# Patient Record
Sex: FEMALE | Race: BLACK OR AFRICAN AMERICAN
[De-identification: names, ages, dates, MRNs, and addresses within clinical notes are randomized per-mention and may not be internally consistent; named-entity substitution may affect disease eponyms.]

---

## 2019-03-29 ENCOUNTER — HOSPITAL ENCOUNTER (EMERGENCY)
Dept: HOSPITAL 72 - EMR | Age: 29
Discharge: HOME | End: 2019-03-29
Payer: COMMERCIAL

## 2019-03-29 VITALS — DIASTOLIC BLOOD PRESSURE: 85 MMHG | SYSTOLIC BLOOD PRESSURE: 122 MMHG

## 2019-03-29 VITALS — HEIGHT: 66 IN | WEIGHT: 140 LBS | BODY MASS INDEX: 22.5 KG/M2

## 2019-03-29 VITALS — SYSTOLIC BLOOD PRESSURE: 120 MMHG | DIASTOLIC BLOOD PRESSURE: 78 MMHG

## 2019-03-29 DIAGNOSIS — R10.9: Primary | ICD-10-CM

## 2019-03-29 LAB
ADD MANUAL DIFF: NO
ALBUMIN SERPL-MCNC: 4.3 G/DL (ref 3.4–5)
ALBUMIN/GLOB SERPL: 1 {RATIO} (ref 1–2.7)
ALP SERPL-CCNC: 63 U/L (ref 46–116)
ALT SERPL-CCNC: 21 U/L (ref 12–78)
ANION GAP SERPL CALC-SCNC: 11 MMOL/L (ref 5–15)
AST SERPL-CCNC: 14 U/L (ref 15–37)
BASOPHILS NFR BLD AUTO: 1.3 % (ref 0–2)
BILIRUB SERPL-MCNC: 0.5 MG/DL (ref 0.2–1)
BUN SERPL-MCNC: 11 MG/DL (ref 7–18)
CALCIUM SERPL-MCNC: 9.4 MG/DL (ref 8.5–10.1)
CHLORIDE SERPL-SCNC: 103 MMOL/L (ref 98–107)
CO2 SERPL-SCNC: 27 MMOL/L (ref 21–32)
CREAT SERPL-MCNC: 0.9 MG/DL (ref 0.55–1.3)
EOSINOPHIL NFR BLD AUTO: 1 % (ref 0–3)
ERYTHROCYTE [DISTWIDTH] IN BLOOD BY AUTOMATED COUNT: 16.1 % (ref 11.6–14.8)
GLOBULIN SER-MCNC: 4.3 G/DL
HCT VFR BLD CALC: 35.9 % (ref 37–47)
HGB BLD-MCNC: 11.2 G/DL (ref 12–16)
LYMPHOCYTES NFR BLD AUTO: 12.5 % (ref 20–45)
MCV RBC AUTO: 77 FL (ref 80–99)
MONOCYTES NFR BLD AUTO: 4.4 % (ref 1–10)
NEUTROPHILS NFR BLD AUTO: 80.8 % (ref 45–75)
PLATELET # BLD: 258 K/UL (ref 150–450)
POTASSIUM SERPL-SCNC: 3.5 MMOL/L (ref 3.5–5.1)
RBC # BLD AUTO: 4.66 M/UL (ref 4.2–5.4)
SODIUM SERPL-SCNC: 140 MMOL/L (ref 136–145)
WBC # BLD AUTO: 7.6 K/UL (ref 4.8–10.8)

## 2019-03-29 PROCEDURE — 96374 THER/PROPH/DIAG INJ IV PUSH: CPT

## 2019-03-29 PROCEDURE — 83690 ASSAY OF LIPASE: CPT

## 2019-03-29 PROCEDURE — 99284 EMERGENCY DEPT VISIT MOD MDM: CPT

## 2019-03-29 PROCEDURE — 85025 COMPLETE CBC W/AUTO DIFF WBC: CPT

## 2019-03-29 PROCEDURE — 96375 TX/PRO/DX INJ NEW DRUG ADDON: CPT

## 2019-03-29 PROCEDURE — 80053 COMPREHEN METABOLIC PANEL: CPT

## 2019-03-29 PROCEDURE — 96361 HYDRATE IV INFUSION ADD-ON: CPT

## 2019-03-29 NOTE — EMERGENCY ROOM REPORT
History of Present Illness


General


Chief Complaint:  Abdominal Pain


Source:  Patient





Present Illness


HPI


Patient sense with complaints of mid abdominal pain reports that she was 

initially having some cramps related possibly to her menstrual cycle


However she also had tacos at a food truck 2 days ago


And has now started with increased nausea


Patient vomited several times


Denies any chest pain or shortness of breath denies any dysuria or frequency 

denies any fevers denies any rash


Allergies:  


Coded Allergies:  


     No Known Allergies (Unverified , 3/29/19)





Patient History


Past Medical History:  see triage record


Pertinent Family History:  none


Last Menstrual Period:  on period


Reviewed Nursing Documentation:  PMH: Agreed; PSxH: Agreed





Nursing Documentation-PMH


Past Medical History:  No History, Except For





Review of Systems


All Other Systems:  negative except mentioned in HPI





Physical Exam





Vital Signs








  Date Time  Temp Pulse Resp B/P (MAP) Pulse Ox O2 Delivery O2 Flow Rate FiO2


 


3/29/19 08:05 98.1 75 14 122/85 97 Room Air  








Sp02 EP Interpretation:  reviewed, normal


General Appearance:  well appearing, no apparent distress


Head:  normocephalic, atraumatic


Eyes:  bilateral eye PERRL, bilateral eye EOMI


ENT:  hearing grossly normal, normal pharynx, TMs + canals normal, uvula midline


Neck:  full range of motion, supple, no meningismus, no bony tend


Respiratory:  lungs clear, normal breath sounds, no rhonchi, no respiratory 

distress, no retraction, no accessory muscle use


Cardiovascular #1:  normal peripheral pulses, regular rate, rhythm, no edema, 

no gallop, no JVD, no murmur


Gastrointestinal:  normal bowel sounds, non tender, soft, no mass, no 

organomegaly, non-distended, no guarding, no hernia, no pulsatile mass, no 

rebound


Genitourinary:  no CVA tenderness


Musculoskeletal:  normal inspection


Neurologic:  oriented x3, responsive, CNs III-XII nml as tested, motor strength/

tone normal, sensory intact


Psychiatric:  mood/affect normal


Skin:  normal color, no rash, warm/dry, palpation normal


Lymphatic:  normal inspection, no adenopathy





Medical Decision Making


Diagnostic Impression:  


 Primary Impression:  


 Abdominal pain


ER Course


With the patient's history and examination, multiple differentials considered, 

including but not limited to pregnancy, ectopic pregnancy, ovarian torsion, 

gastritis, cholecystitis, pancreatitis, appendicitis





Patient's blood work is appropriate


Patient has not been able to provide urine sample


And at this time reports that she would rather not provide urine sample she is 

aware that without a documented pregnancy diagnosis cannot rule out other 

differentials such as ectopic pregnancy patient again reports that she is on 

her menstrual cycle and is not pregnant and understands the risks of not 

performing this test





And will follow closely as outpatient





Labs








Test


  3/29/19


08:46


 


White Blood Count


  7.6 K/UL


(4.8-10.8)


 


Red Blood Count


  4.66 M/UL


(4.20-5.40)


 


Hemoglobin


  11.2 G/DL


(12.0-16.0)


 


Hematocrit


  35.9 %


(37.0-47.0)


 


Mean Corpuscular Volume 77 FL (80-99) 


 


Mean Corpuscular Hemoglobin


  24.1 PG


(27.0-31.0)


 


Mean Corpuscular Hemoglobin


Concent 31.3 G/DL


(32.0-36.0)


 


Red Cell Distribution Width


  16.1 %


(11.6-14.8)


 


Platelet Count


  258 K/UL


(150-450)


 


Mean Platelet Volume


  7.8 FL


(6.5-10.1)


 


Neutrophils (%) (Auto)


  80.8 %


(45.0-75.0)


 


Lymphocytes (%) (Auto)


  12.5 %


(20.0-45.0)


 


Monocytes (%) (Auto)


  4.4 %


(1.0-10.0)


 


Eosinophils (%) (Auto)


  1.0 %


(0.0-3.0)


 


Basophils (%) (Auto)


  1.3 %


(0.0-2.0)


 


Sodium Level


  140 MMOL/L


(136-145)


 


Potassium Level


  3.5 MMOL/L


(3.5-5.1)


 


Chloride Level


  103 MMOL/L


()


 


Carbon Dioxide Level


  27 MMOL/L


(21-32)


 


Anion Gap


  11 mmol/L


(5-15)


 


Blood Urea Nitrogen


  11 mg/dL


(7-18)


 


Creatinine


  0.9 MG/DL


(0.55-1.30)


 


Estimat Glomerular Filtration


Rate > 60 mL/min


(>60)


 


Glucose Level


  103 MG/DL


()


 


Calcium Level


  9.4 MG/DL


(8.5-10.1)


 


Total Bilirubin


  0.5 MG/DL


(0.2-1.0)


 


Aspartate Amino Transf


(AST/SGOT) 14 U/L (15-37) 


 


 


Alanine Aminotransferase


(ALT/SGPT) 21 U/L (12-78) 


 


 


Alkaline Phosphatase


  63 U/L


()


 


Total Protein


  8.6 G/DL


(6.4-8.2)


 


Albumin


  4.3 G/DL


(3.4-5.0)


 


Globulin 4.3 g/dL 


 


Albumin/Globulin Ratio 1.0 (1.0-2.7) 


 


Lipase


  76 U/L


()











Last Vital Signs








  Date Time  Temp Pulse Resp B/P (MAP) Pulse Ox O2 Delivery O2 Flow Rate FiO2


 


3/29/19 08:05 98.1 75 14 122/85 97 Room Air  








Status:  improved


Disposition:  HOME, SELF-CARE


Condition:  Improved


Scripts


Famotidine (PEPCID AC) 10 Mg Tablet


10 MG PO DAILY for 7 Days, #7 TAB


   Prov: Anais Salas DO         3/29/19 


Ondansetron* (ZOFRAN*) 4 Mg Tablet


4 MG ORAL Q8HR PRN for Nausea & Vomiting for 7 Days, #21 TAB


   Prov: Anais Salas DO         3/29/19





Additional Instructions:  


Patient is provided with the discharge instructions notified to follow up with 

primary doctor in the next 2-3 days otherwise return to the er with any 

worsening symptoms.


Please note that this report is being documented using DRAGON technology.  This 

can lead to erroneous entry secondary to incorrect interpretation by the 

dictating instrument.











Anais Salas DO Mar 29, 2019 08:28